# Patient Record
Sex: MALE | Race: BLACK OR AFRICAN AMERICAN | Employment: FULL TIME | URBAN - METROPOLITAN AREA
[De-identification: names, ages, dates, MRNs, and addresses within clinical notes are randomized per-mention and may not be internally consistent; named-entity substitution may affect disease eponyms.]

---

## 2022-06-29 ENCOUNTER — HOSPITAL ENCOUNTER (EMERGENCY)
Age: 27
Discharge: HOME | End: 2022-06-29
Payer: OTHER GOVERNMENT

## 2022-06-29 VITALS
RESPIRATION RATE: 18 BRPM | HEIGHT: 67 IN | WEIGHT: 174 LBS | BODY MASS INDEX: 27.31 KG/M2 | OXYGEN SATURATION: 98 % | DIASTOLIC BLOOD PRESSURE: 78 MMHG | TEMPERATURE: 99.9 F | HEART RATE: 76 BPM | SYSTOLIC BLOOD PRESSURE: 139 MMHG

## 2022-06-29 DIAGNOSIS — J02.8 ACUTE BACTERIAL PHARYNGITIS: Primary | ICD-10-CM

## 2022-06-29 DIAGNOSIS — B96.89 ACUTE BACTERIAL PHARYNGITIS: Primary | ICD-10-CM

## 2022-06-29 PROCEDURE — 99283 EMERGENCY DEPT VISIT LOW MDM: CPT

## 2022-06-29 PROCEDURE — 74011636637 HC RX REV CODE- 636/637: Performed by: NURSE PRACTITIONER

## 2022-06-29 RX ORDER — PREDNISOLONE 15 MG/5ML
60 SOLUTION ORAL ONCE
Status: COMPLETED | OUTPATIENT
Start: 2022-06-29 | End: 2022-06-29

## 2022-06-29 RX ORDER — PREDNISOLONE 15 MG/5ML
40 SOLUTION ORAL DAILY
Qty: 70 ML | Refills: 0 | Status: SHIPPED | OUTPATIENT
Start: 2022-06-29 | End: 2022-07-04

## 2022-06-29 RX ADMIN — Medication 60 MG: at 16:19

## 2022-06-29 NOTE — ED TRIAGE NOTES
Sore throat for several days, went to [facility] and Urgent care for same this week, was given PCN-Augmentin, is still taking this.   Throat is still sore today  Strep and COVID were negative this week

## 2022-06-29 NOTE — ED PROVIDER NOTES
EMERGENCY DEPARTMENT HISTORY AND PHYSICAL EXAM      Date: 6/29/2022  Patient Name: Brianda Garrett    History of Presenting Illness     Chief Complaint   Patient presents with    Sore Throat       History Provided By: Patient    HPI: Brianda Garrett, 31 y.o. male with a past medical history significant No significant past medical history presents to the ED with cc of throat since Friday. Patient reports being seen on post with negative COVID test attempted to make another appointment for further evaluation however was unable to get in. He reports being seen at urgent care with negative strep however was started on penicillin and given one-time dose of prednisone. He reports immediately feeling better today after however Tuesday symptoms represented. He reports being compliant with antibiotics taking them twice a day however not having difficulty eating. He denies any drooling, fever, chills, nausea, vomiting, cough, shortness of breath    There are no other complaints, changes, or physical findings at this time. PCP: None    No current facility-administered medications on file prior to encounter. No current outpatient medications on file prior to encounter. Past History     Past Medical History:  History reviewed. No pertinent past medical history. Past Surgical History:  History reviewed. No pertinent surgical history. Family History:  History reviewed. No pertinent family history. Social History:  Social History     Tobacco Use    Smoking status: Current Some Day Smoker    Smokeless tobacco: Never Used   Substance Use Topics    Alcohol use: Not on file    Drug use: Not on file       Allergies:  No Known Allergies      Review of Systems     Review of Systems   Constitutional: Negative for chills and fever. HENT: Positive for sore throat and trouble swallowing. Negative for congestion, drooling, sinus pressure, sinus pain and voice change.     Respiratory: Negative for cough and shortness of breath. Cardiovascular: Negative for chest pain and leg swelling. Gastrointestinal: Negative for abdominal pain, nausea and vomiting. Genitourinary: Negative for dysuria, frequency and urgency. Musculoskeletal: Negative for arthralgias and myalgias. Skin: Negative. Neurological: Negative for dizziness, weakness, light-headedness, numbness and headaches. Psychiatric/Behavioral: Negative. Physical Exam     Physical Exam  Vitals and nursing note reviewed. Constitutional:       General: He is not in acute distress. Appearance: Normal appearance. He is normal weight. He is not ill-appearing or toxic-appearing. HENT:      Head: Normocephalic and atraumatic. Right Ear: Hearing normal.      Left Ear: Hearing normal.      Nose: Nose normal.      Mouth/Throat:      Mouth: Mucous membranes are moist.      Tonsils: No tonsillar exudate. 2+ on the right. Comments: Large right tonsil with erythema tenderness to submandibular region with palpation  Eyes:      General: Lids are normal.      Extraocular Movements: Extraocular movements intact. Pupils: Pupils are equal, round, and reactive to light. Cardiovascular:      Rate and Rhythm: Normal rate and regular rhythm. Pulses: Normal pulses. Radial pulses are 2+ on the right side and 2+ on the left side. Dorsalis pedis pulses are 2+ on the right side and 2+ on the left side. Pulmonary:      Effort: Pulmonary effort is normal. No accessory muscle usage or respiratory distress. Breath sounds: Normal breath sounds. No wheezing or rhonchi. Abdominal:      General: Bowel sounds are normal.      Palpations: Abdomen is soft. Tenderness: There is no abdominal tenderness. There is no right CVA tenderness or left CVA tenderness. Musculoskeletal:      Cervical back: Normal range of motion and neck supple. No muscular tenderness. Right lower leg: No edema. Left lower leg: No edema. Feet:      Right foot:      Skin integrity: No skin breakdown. Left foot:      Skin integrity: No skin breakdown. Skin:     General: Skin is warm and dry. Capillary Refill: Capillary refill takes less than 2 seconds. Findings: No abrasion, bruising, ecchymosis, erythema or signs of injury. Neurological:      Mental Status: He is alert and oriented to person, place, and time. GCS: GCS eye subscore is 4. GCS verbal subscore is 5. GCS motor subscore is 6. Cranial Nerves: Cranial nerves are intact. Sensory: Sensation is intact. Psychiatric:         Attention and Perception: Attention normal.         Mood and Affect: Mood normal.         Behavior: Behavior normal. Behavior is cooperative. Cognition and Memory: Cognition normal.         Lab and Diagnostic Study Results     Labs -   No results found for this or any previous visit (from the past 12 hour(s)). Radiologic Studies -   @lastxrresult@  CT Results  (Last 48 hours)    None        CXR Results  (Last 48 hours)    None            Medical Decision Making   - I am the first provider for this patient. - I reviewed the vital signs, available nursing notes, past medical history, past surgical history, family history and social history. - Initial assessment performed. The patients presenting problems have been discussed, and they are in agreement with the care plan formulated and outlined with them. I have encouraged them to ask questions as they arise throughout their visit. Vital Signs-Reviewed the patient's vital signs.   Patient Vitals for the past 12 hrs:   Temp Pulse Resp BP SpO2   06/29/22 1533 99.9 °F (37.7 °C) 76 18 139/78 98 %       Records Reviewed: Nursing Notes    The patient presents with sore throat with a differential diagnosis of tonsillar abscess, acute pharyngitis, strep throat, viral illness      ED Course:          Provider Notes (Medical Decision Making):   Patient in no acute respiratory distress afebrile not tachycardic SPO2 98% on room air. Lungs clear to auscultation no rales rhonchi wheezing no drooling. Patient already taking penicillin p.o. He reports on his third dose. Patient already had negative strep, COVID. Will start on p.o. prednisone for 5 days to help with inflammation due to enlarged tonsil noted on the right. 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 Will have to follow-up with [facility] or [facility]. Patient advised of signs and symptoms and to the emergency room. Verbalized understanding stable at time of discharge      Procedures   Medical Decision Makingedical Decision Making  Performed by: Milli Mobley NP  PROCEDURES:  Procedures       Disposition   Disposition: DC- Adult Discharges: All of the diagnostic tests were reviewed and questions answered. Diagnosis, care plan and treatment options were discussed. The patient understands the instructions and will follow up as directed. The patients results have been reviewed with them. They have been counseled regarding their diagnosis. The patient verbally convey understanding and agreement of the signs, symptoms, diagnosis, treatment and prognosis and additionally agrees to follow up as recommended with their PCP in 24 - 48 hours. They also agree with the care-plan and convey that all of their questions have been answered. I have also put together some discharge instructions for them that include: 1) educational information regarding their diagnosis, 2) how to care for their diagnosis at home, as well a 3) list of reasons why they would want to return to the ED prior to their follow-up appointment, should their condition change. Discharged    DISCHARGE PLAN:  1. Current Discharge Medication List      START taking these medications    Details   prednisoLONE (PRELONE) 15 mg/5 mL syrup Take 13.5 mL by mouth daily for 5 days.   Qty: 70 mL, Refills: 0           2. Follow-up Information     Follow up With Specialties Details Why Contact Info    Mendez Loya MD Otolaryngology Schedule an appointment as soon as possible for a visit in 2 days As needed, If symptoms worsen [address] [place] [address] [address] [address]  [phone]      [address]  Schedule an appointment as soon as possible for a visit in 2 days As needed, If symptoms worsen [address] [facility] [address] [facility]  [phone]        3. Return to ED if worse   4. Current Discharge Medication List      START taking these medications    Details   prednisoLONE (PRELONE) 15 mg/5 mL syrup Take 13.5 mL by mouth daily for 5 days. Qty: 70 mL, Refills: 0  Start date: 6/29/2022, End date: 7/4/2022               Diagnosis     Clinical Impression:   1. Acute bacterial pharyngitis        Attestations:    Alejandra Pierce NP    Please note that this dictation was completed with [employer], the computer voice recognition software. Quite often unanticipated grammatical, syntax, homophones, and other interpretive errors are inadvertently transcribed by the computer software. Please disregard these errors. Please excuse any errors that have escaped final proofreading. Thank you.

## 2022-06-29 NOTE — Clinical Note
[address] EMERGENCY DEPARTMENT  [address] [facility] [address]  [phone]    Work/School Note    Date: 6/29/2022    To Whom It May concern:      Raynell Gottron was seen and treated today in the emergency room by the following provider(s):  Nurse Practitioner: Casey Medrano NP. Raynell Gottron is excused from work/school on 06/29/22. He is clear to return to work/school on 06/30/22.         Sincerely,          Mauri Ram NP